# Patient Record
Sex: MALE | Race: WHITE | NOT HISPANIC OR LATINO | Employment: FULL TIME | URBAN - METROPOLITAN AREA
[De-identification: names, ages, dates, MRNs, and addresses within clinical notes are randomized per-mention and may not be internally consistent; named-entity substitution may affect disease eponyms.]

---

## 2024-08-02 ENCOUNTER — TELEMEDICINE (OUTPATIENT)
Dept: MEDICAL GROUP | Facility: LAB | Age: 39
End: 2024-08-02
Payer: COMMERCIAL

## 2024-08-02 DIAGNOSIS — E78.5 DYSLIPIDEMIA: ICD-10-CM

## 2024-08-02 DIAGNOSIS — E11.9 TYPE 2 DIABETES MELLITUS WITHOUT COMPLICATION, WITHOUT LONG-TERM CURRENT USE OF INSULIN (HCC): ICD-10-CM

## 2024-08-02 PROCEDURE — 99214 OFFICE O/P EST MOD 30 MIN: CPT | Mod: 95 | Performed by: STUDENT IN AN ORGANIZED HEALTH CARE EDUCATION/TRAINING PROGRAM

## 2024-08-02 ASSESSMENT — ENCOUNTER SYMPTOMS
CHILLS: 0
FEVER: 0
SHORTNESS OF BREATH: 0

## 2024-08-02 NOTE — PROGRESS NOTES
Subjective:   This evaluation was conducted via Zoom using secure and encrypted videoconferencing technology. The patient was in a private location in the state of [place].    The patient's identity was confirmed and verbal consent was obtained for this virtual visit.    CC:   Chief Complaint   Patient presents with    Results        HPI:       Problem   Dyslipidemia    Lab Results   Component Value Date/Time    CHOLSTRLTOT 219 (H) 07/17/2024 12:00 AM    LDL see below 07/17/2024 12:00 AM    HDL 27 (A) 07/17/2024 12:00 AM    TRIGLYCERIDE 544 (H) 07/17/2024 12:00 AM        Type 2 Diabetes Mellitus Without Complication, Without Long-Term Current Use of Insulin (Hcc)    Dx in 2018  -has taken victoza in the past and tolerated well however due to insurance changes medication was stopped  -tried ozempic but had severe stomach upset   -he currently only takes metformin 500mg BID     8/2/24  -patient's blood glucose in the morning with metformin 500mg BID has been ranging between 118-128             Current Outpatient Medications Ordered in Epic   Medication Sig Dispense Refill    Dulaglutide 0.75 MG/0.5ML Solution Pen-injector Inject 1 Pen under the skin every 7 days. 1.96 mL 0    Blood Glucose Test Strips Use one One Touch Ultra 2 strip to test blood sugar twice daily before meals. 100 Strip 3    metFORMIN (GLUCOPHAGE) 500 MG Tab Take 1 Tablet by mouth 2 times a day with meals. 90 Tablet 2    hydrocortisone 2.5 % Cream topical cream Apply 1 Application topically 2 times a day. Only use for 5 days at a time, then stop for at least 5 days before reapplying 20 g 1    Lancets Use one One Touch Ultra 2 lancet to test blood sugar once daily . 100 Each 3    Blood Glucose Meter Kit Test blood sugar as recommended by provider. One Touch Ultra 2 blood glucose monitoring kit. 1 Kit 0    Alcohol Swabs Wipe site with prep pad prior to injection. 100 Each 3    acetaminophen (TYLENOL) 325 MG TABS Take 650 mg by mouth every four hours as  needed.       No current Epic-ordered facility-administered medications on file.           ROS:  Review of Systems   Constitutional:  Negative for chills and fever.   Respiratory:  Negative for shortness of breath.    Cardiovascular:  Negative for chest pain.       Objective:     Exam:  There were no vitals taken for this visit. There is no height or weight on file to calculate BMI.    Physical Exam  Constitutional:       General: He is not in acute distress.     Appearance: He is not ill-appearing.   Pulmonary:      Effort: Pulmonary effort is normal.   Neurological:      Mental Status: He is alert.   Psychiatric:         Mood and Affect: Mood normal.         Behavior: Behavior normal.         Thought Content: Thought content normal.         Judgment: Judgment normal.                   Assessment & Plan:     Problem List Items Addressed This Visit       Dyslipidemia     Chronic-not controlled  -I did advise the patient that we should start a medication however patient is hesitant and would like to maximize lifestyle changes  - If patient's triglycerides do not decrease to less than 250 we will start statin therapy         Relevant Orders    Lipid Profile    Type 2 diabetes mellitus without complication, without long-term current use of insulin (HCC)     Chronic-not completely controlled  -continue metformin 500mg BID  -start trulicity          Relevant Medications    Dulaglutide 0.75 MG/0.5ML Solution Pen-injector    Other Relevant Orders    Comp Metabolic Panel    HEMOGLOBIN A1C     Annual in December       Please note that this dictation was created using voice recognition software. I have made every reasonable attempt to correct obvious errors, but I expect that there are errors of grammar and possibly content that I did not discover before finalizing the note.

## 2024-08-02 NOTE — ASSESSMENT & PLAN NOTE
Chronic-not controlled  -I did advise the patient that we should start a medication however patient is hesitant and would like to maximize lifestyle changes  - If patient's triglycerides do not decrease to less than 250 we will start statin therapy

## 2024-08-06 ENCOUNTER — TELEPHONE (OUTPATIENT)
Dept: MEDICAL GROUP | Facility: LAB | Age: 39
End: 2024-08-06
Payer: COMMERCIAL

## 2024-08-06 NOTE — TELEPHONE ENCOUNTER
MEDICATION PRIOR AUTHORIZATION NEEDED:    1. Name of Medication: Trulicity 0.75MG/0.5ML pen-injectors    2. Requested By (Name of Pharmacy): [facility]     3. Is insurance on file current? yes    4. What is the name & phone number of the 3rd party payor? (Key: [id number])     There was an error with your request  Patient eligibility could not be verified against date of service. Please review date of service.  75 PA or Step Required-Prescriber Call [phone]

## 2024-08-13 NOTE — TELEPHONE ENCOUNTER
There was an error with your request  Patient eligibility could not be verified against date of service. Please review date of service.